# Patient Record
Sex: MALE | Race: ASIAN | NOT HISPANIC OR LATINO | URBAN - METROPOLITAN AREA
[De-identification: names, ages, dates, MRNs, and addresses within clinical notes are randomized per-mention and may not be internally consistent; named-entity substitution may affect disease eponyms.]

---

## 2020-03-18 PROBLEM — H33.022 RETINAL DETACHMENT WITH MULTIPLE BREAKS: Noted: 2022-01-24

## 2020-03-18 PROBLEM — H26.492 POSTERIOR CAPSULAR OPACITY: Noted: 2022-01-24

## 2020-03-18 PROBLEM — H35.372 EPIRETINAL MEMBRANE: Noted: 2022-01-24

## 2020-03-18 PROBLEM — H53.19 PHOTOPSIA: Noted: 2022-01-24

## 2020-03-18 PROBLEM — H40.1134 POAG, INDETERMINATE: Noted: 2022-01-24

## 2020-03-18 PROBLEM — H43.821 VITREOMACULAR ADHESION: Noted: 2022-01-24

## 2020-03-18 PROBLEM — Z98.890 S/P PPV: Noted: 2020-03-18

## 2020-03-18 PROBLEM — H35.411 LATTICE DEGENERATION OF RETINA: Noted: 2022-01-24

## 2022-01-24 ENCOUNTER — PREPPED CHART (OUTPATIENT)
Dept: URBAN - METROPOLITAN AREA CLINIC 113 | Facility: CLINIC | Age: 57
End: 2022-01-24

## 2022-01-24 PROBLEM — H35.372 EPIRETINAL MEMBRANE: Noted: 2022-01-24

## 2022-01-24 PROBLEM — H35.411 LATTICE DEGENERATION OF RETINA: Status: WELL-CONTROLLED | Noted: 2022-01-24

## 2022-01-24 PROBLEM — H53.19 PHOTOPSIA: Noted: 2022-01-24

## 2022-01-24 PROBLEM — H26.492 POSTERIOR CAPSULAR OPACITY: Noted: 2022-01-24

## 2022-01-24 PROBLEM — H35.411 LATTICE DEGENERATION OF RETINA: Noted: 2022-01-24

## 2022-01-24 PROBLEM — H43.821 VITREOMACULAR ADHESION: Noted: 2022-01-24

## 2022-01-24 PROBLEM — H33.022 RETINAL DETACHMENT WITH MULTIPLE BREAKS: Noted: 2022-01-24

## 2022-01-24 PROBLEM — H40.1134 POAG, INDETERMINATE: Noted: 2022-01-24

## 2022-01-24 PROBLEM — H33.022 RETINAL DETACHMENT WITH MULTIPLE BREAKS: Status: WELL-CONTROLLED | Noted: 2022-01-24

## 2022-03-14 ENCOUNTER — APPOINTMENT (RX ONLY)
Dept: URBAN - METROPOLITAN AREA CLINIC 152 | Facility: CLINIC | Age: 57
Setting detail: DERMATOLOGY
End: 2022-03-14

## 2022-03-14 DIAGNOSIS — L82.0 INFLAMED SEBORRHEIC KERATOSIS: ICD-10-CM

## 2022-03-14 DIAGNOSIS — D22 MELANOCYTIC NEVI: ICD-10-CM

## 2022-03-14 DIAGNOSIS — L57.8 OTHER SKIN CHANGES DUE TO CHRONIC EXPOSURE TO NONIONIZING RADIATION: ICD-10-CM

## 2022-03-14 PROBLEM — D22.21 MELANOCYTIC NEVI OF RIGHT EAR AND EXTERNAL AURICULAR CANAL: Status: ACTIVE | Noted: 2022-03-14

## 2022-03-14 PROCEDURE — ? BENIGN DESTRUCTION

## 2022-03-14 PROCEDURE — ? COUNSELING

## 2022-03-14 PROCEDURE — 17110 DESTRUCTION B9 LES UP TO 14: CPT

## 2022-03-14 PROCEDURE — ? OBSERVATION AND MEASURE

## 2022-03-14 PROCEDURE — ? TREATMENT REGIMEN

## 2022-03-14 PROCEDURE — 99203 OFFICE O/P NEW LOW 30 MIN: CPT | Mod: 25

## 2022-03-14 ASSESSMENT — LOCATION ZONE DERM
LOCATION ZONE: EAR
LOCATION ZONE: FACE

## 2022-03-14 ASSESSMENT — LOCATION DETAILED DESCRIPTION DERM
LOCATION DETAILED: RIGHT SUPERIOR LATERAL MALAR CHEEK
LOCATION DETAILED: RIGHT SUPERIOR HELIX

## 2022-03-14 ASSESSMENT — LOCATION SIMPLE DESCRIPTION DERM
LOCATION SIMPLE: RIGHT EAR
LOCATION SIMPLE: RIGHT CHEEK

## 2022-03-14 NOTE — PROCEDURE: MIPS QUALITY
Quality 130: Documentation Of Current Medications In The Medical Record: Current Medications Documented
Quality 111:Pneumonia Vaccination Status For Older Adults: Pneumococcal Vaccination Previously Received
Quality 431: Preventive Care And Screening: Unhealthy Alcohol Use - Screening: Patient not identified as an unhealthy alcohol user when screened for unhealthy alcohol use using a systematic screening method
Quality 47: Advance Care Plan: Advance care planning not documented, reason not otherwise specified.
Quality 110: Preventive Care And Screening: Influenza Immunization: Influenza Immunization previously received during influenza season
Quality 226: Preventive Care And Screening: Tobacco Use: Screening And Cessation Intervention: Patient screened for tobacco use and is an ex/non-smoker
Detail Level: Detailed
room air

## 2022-03-14 NOTE — HPI: SKIN LESIONS
How Severe Is Your Skin Lesion?: moderate
Is This A New Presentation, Or A Follow-Up?: Skin Lesions
Additional History: Pt notes these lesions get irritated when shaving and have bled and been destroyed from shaving but keep growing back.

## 2022-05-16 ENCOUNTER — FOLLOW UP (OUTPATIENT)
Dept: URBAN - METROPOLITAN AREA CLINIC 113 | Facility: CLINIC | Age: 57
End: 2022-05-16

## 2022-05-16 DIAGNOSIS — H53.19: ICD-10-CM

## 2022-05-16 DIAGNOSIS — H43.811: ICD-10-CM

## 2022-05-16 DIAGNOSIS — H35.372: ICD-10-CM

## 2022-05-16 DIAGNOSIS — H40.1134: ICD-10-CM

## 2022-05-16 DIAGNOSIS — H33.022: ICD-10-CM

## 2022-05-16 DIAGNOSIS — H26.492: ICD-10-CM

## 2022-05-16 DIAGNOSIS — H35.411: ICD-10-CM

## 2022-05-16 DIAGNOSIS — H43.821: ICD-10-CM

## 2022-05-16 PROCEDURE — 99214 OFFICE O/P EST MOD 30 MIN: CPT

## 2022-05-16 PROCEDURE — 92134 CPTRZ OPH DX IMG PST SGM RTA: CPT

## 2022-05-16 PROCEDURE — 92201 OPSCPY EXTND RTA DRAW UNI/BI: CPT

## 2022-05-16 ASSESSMENT — TONOMETRY
OD_IOP_MMHG: 19
OD_IOP_MMHG: 12
OS_IOP_MMHG: 19
OS_IOP_MMHG: 17

## 2022-05-16 ASSESSMENT — VISUAL ACUITY
OD_CC: 20/20
OD_CC: 20/20-1
OS_CC: CF 3FT
OS_CC: CF 7FT

## 2022-06-27 ENCOUNTER — FOLLOW UP (OUTPATIENT)
Dept: URBAN - METROPOLITAN AREA CLINIC 113 | Facility: CLINIC | Age: 57
End: 2022-06-27

## 2022-06-27 DIAGNOSIS — H53.19: ICD-10-CM

## 2022-06-27 DIAGNOSIS — H33.022: ICD-10-CM

## 2022-06-27 DIAGNOSIS — H43.811: ICD-10-CM

## 2022-06-27 DIAGNOSIS — H43.821: ICD-10-CM

## 2022-06-27 DIAGNOSIS — H35.372: ICD-10-CM

## 2022-06-27 DIAGNOSIS — H26.492: ICD-10-CM

## 2022-06-27 DIAGNOSIS — H40.1134: ICD-10-CM

## 2022-06-27 DIAGNOSIS — H35.411: ICD-10-CM

## 2022-06-27 PROCEDURE — 92201 OPSCPY EXTND RTA DRAW UNI/BI: CPT

## 2022-06-27 PROCEDURE — 99214 OFFICE O/P EST MOD 30 MIN: CPT

## 2022-06-27 PROCEDURE — 92134 CPTRZ OPH DX IMG PST SGM RTA: CPT

## 2022-06-27 ASSESSMENT — TONOMETRY
OS_IOP_MMHG: 18
OD_IOP_MMHG: 12

## 2022-06-27 ASSESSMENT — VISUAL ACUITY
OS_CC: CF 2FT
OD_CC: 20/20

## 2022-09-28 NOTE — PROCEDURE: BENIGN DESTRUCTION
Add 52 Modifier (Optional): no
Medical Necessity Clause: This procedure was medically necessary because the lesions that were treated were: young patient, psychologically distressing, potential to ulcerate and cause non healing wound
Consent: The patient's consent was obtained including but not limited to risks of crusting, scabbing, blistering, scarring, darker or lighter pigmentary change, recurrence, incomplete removal and infection.
Treatment Number (Will Not Render If 0): 0
Post-Care Instructions: I reviewed with the patient in detail post-care instructions. Patient is to wear sunprotection, and avoid picking at any of the treated lesions. Pt may apply Vaseline to crusted or scabbing areas.
Detail Level: Detailed
Anesthesia Type: 1% lidocaine with 1:100,000 epinephrine and a 1:10 solution of 8.4% sodium bicarbonate
Medical Necessity Information: It is in your best interest to select a reason for this procedure from the list below. All of these items fulfill various CMS LCD requirements except the new and changing color options.
Anesthesia Volume In Cc: 3
14.4

## 2022-10-24 ENCOUNTER — FOLLOW UP (OUTPATIENT)
Dept: URBAN - METROPOLITAN AREA CLINIC 113 | Facility: CLINIC | Age: 57
End: 2022-10-24

## 2022-10-24 DIAGNOSIS — H33.022: ICD-10-CM

## 2022-10-24 DIAGNOSIS — H35.411: ICD-10-CM

## 2022-10-24 DIAGNOSIS — H43.811: ICD-10-CM

## 2022-10-24 DIAGNOSIS — H35.372: ICD-10-CM

## 2022-10-24 DIAGNOSIS — H43.821: ICD-10-CM

## 2022-10-24 DIAGNOSIS — H40.1134: ICD-10-CM

## 2022-10-24 PROCEDURE — 92201 OPSCPY EXTND RTA DRAW UNI/BI: CPT

## 2022-10-24 PROCEDURE — 92134 CPTRZ OPH DX IMG PST SGM RTA: CPT

## 2022-10-24 PROCEDURE — 92014 COMPRE OPH EXAM EST PT 1/>: CPT

## 2022-10-24 ASSESSMENT — TONOMETRY
OD_IOP_MMHG: 11
OS_IOP_MMHG: 26
OD_IOP_MMHG: 16
OS_IOP_MMHG: 22
OD_IOP_MMHG: 12

## 2022-10-24 ASSESSMENT — VISUAL ACUITY: OD_CC: 20/20

## 2023-02-22 ENCOUNTER — FOLLOW UP (OUTPATIENT)
Dept: URBAN - METROPOLITAN AREA CLINIC 101 | Facility: CLINIC | Age: 58
End: 2023-02-22

## 2023-02-22 DIAGNOSIS — H35.372: ICD-10-CM

## 2023-02-22 DIAGNOSIS — H40.1134: ICD-10-CM

## 2023-02-22 DIAGNOSIS — H35.411: ICD-10-CM

## 2023-02-22 DIAGNOSIS — H33.022: ICD-10-CM

## 2023-02-22 DIAGNOSIS — H43.821: ICD-10-CM

## 2023-02-22 DIAGNOSIS — H43.811: ICD-10-CM

## 2023-02-22 PROCEDURE — 92201 OPSCPY EXTND RTA DRAW UNI/BI: CPT

## 2023-02-22 PROCEDURE — 92014 COMPRE OPH EXAM EST PT 1/>: CPT

## 2023-02-22 PROCEDURE — 92134 CPTRZ OPH DX IMG PST SGM RTA: CPT

## 2023-02-22 ASSESSMENT — VISUAL ACUITY
OS_CC: CF 2FT
OD_CC: 20/20

## 2023-02-22 ASSESSMENT — TONOMETRY
OS_IOP_MMHG: 17
OD_IOP_MMHG: 10

## 2023-07-21 ENCOUNTER — FOLLOW UP (OUTPATIENT)
Dept: URBAN - METROPOLITAN AREA CLINIC 91 | Facility: CLINIC | Age: 58
End: 2023-07-21

## 2023-07-21 DIAGNOSIS — H35.411: ICD-10-CM

## 2023-07-21 DIAGNOSIS — H35.372: ICD-10-CM

## 2023-07-21 PROCEDURE — 92201 OPSCPY EXTND RTA DRAW UNI/BI: CPT

## 2023-07-21 PROCEDURE — 92134 CPTRZ OPH DX IMG PST SGM RTA: CPT

## 2023-07-21 PROCEDURE — 92014 COMPRE OPH EXAM EST PT 1/>: CPT

## 2023-07-21 ASSESSMENT — VISUAL ACUITY
OS_CC: 20/400
OD_CC: 20/20-1

## 2023-07-21 ASSESSMENT — TONOMETRY
OD_IOP_MMHG: 15
OS_IOP_MMHG: 17

## 2024-01-04 ENCOUNTER — FOLLOW UP (OUTPATIENT)
Dept: URBAN - METROPOLITAN AREA CLINIC 91 | Facility: CLINIC | Age: 59
End: 2024-01-04

## 2024-01-04 DIAGNOSIS — H43.811: ICD-10-CM

## 2024-01-04 DIAGNOSIS — H35.411: ICD-10-CM

## 2024-01-04 DIAGNOSIS — H35.372: ICD-10-CM

## 2024-01-04 PROCEDURE — 92014 COMPRE OPH EXAM EST PT 1/>: CPT

## 2024-01-04 PROCEDURE — 92134 CPTRZ OPH DX IMG PST SGM RTA: CPT

## 2024-01-04 PROCEDURE — 92201 OPSCPY EXTND RTA DRAW UNI/BI: CPT

## 2024-01-04 ASSESSMENT — TONOMETRY
OS_IOP_MMHG: 15
OD_IOP_MMHG: 16

## 2024-01-04 ASSESSMENT — VISUAL ACUITY
OD_CC: 20/20
OS_CC: CF 5FT

## 2024-01-11 ENCOUNTER — PROCEDURE ONLY (OUTPATIENT)
Dept: URBAN - METROPOLITAN AREA CLINIC 91 | Facility: CLINIC | Age: 59
End: 2024-01-11

## 2024-01-11 DIAGNOSIS — H35.411: ICD-10-CM

## 2024-01-11 PROCEDURE — 67145 PROPH RTA DTCHMNT PC: CPT

## 2024-01-11 ASSESSMENT — VISUAL ACUITY: OD_CC: 20/20

## 2024-01-11 ASSESSMENT — TONOMETRY: OD_IOP_MMHG: 13

## 2024-08-27 ENCOUNTER — FOLLOW UP (OUTPATIENT)
Dept: URBAN - METROPOLITAN AREA CLINIC 91 | Facility: CLINIC | Age: 59
End: 2024-08-27

## 2024-08-27 DIAGNOSIS — H35.372: ICD-10-CM

## 2024-08-27 DIAGNOSIS — H43.811: ICD-10-CM

## 2024-08-27 PROCEDURE — 92134 CPTRZ OPH DX IMG PST SGM RTA: CPT

## 2024-08-27 PROCEDURE — 92201 OPSCPY EXTND RTA DRAW UNI/BI: CPT

## 2024-08-27 PROCEDURE — 92014 COMPRE OPH EXAM EST PT 1/>: CPT

## 2024-08-27 ASSESSMENT — VISUAL ACUITY
OD_CC: 20/20
OS_CC: CF 4FT

## 2024-08-27 ASSESSMENT — TONOMETRY
OS_IOP_MMHG: 18
OD_IOP_MMHG: 12